# Patient Record
Sex: MALE | Race: WHITE | URBAN - NONMETROPOLITAN AREA
[De-identification: names, ages, dates, MRNs, and addresses within clinical notes are randomized per-mention and may not be internally consistent; named-entity substitution may affect disease eponyms.]

---

## 2021-07-29 ENCOUNTER — OFFICE VISIT (OUTPATIENT)
Dept: FAMILY MEDICINE CLINIC | Age: 26
End: 2021-07-29

## 2021-07-29 VITALS
WEIGHT: 163 LBS | OXYGEN SATURATION: 96 % | BODY MASS INDEX: 24.71 KG/M2 | TEMPERATURE: 97.4 F | DIASTOLIC BLOOD PRESSURE: 76 MMHG | RESPIRATION RATE: 18 BRPM | SYSTOLIC BLOOD PRESSURE: 120 MMHG | HEART RATE: 106 BPM | HEIGHT: 68 IN

## 2021-07-29 DIAGNOSIS — M54.9 MID BACK PAIN: Primary | ICD-10-CM

## 2021-07-29 DIAGNOSIS — R20.2 NUMBNESS AND TINGLING: ICD-10-CM

## 2021-07-29 DIAGNOSIS — R20.0 NUMBNESS AND TINGLING: ICD-10-CM

## 2021-07-29 PROCEDURE — 99215 OFFICE O/P EST HI 40 MIN: CPT | Performed by: NURSE PRACTITIONER

## 2021-07-29 NOTE — PROGRESS NOTES
Chief Complaint:   Back Pain (stemming into legs, arms, & face )    History of Present Illness   Source of history provided by:  patient. Sandra Adorno is a 23 y.o. old male who presents to the walk in clinic for evaluation of mid back pain for the past 1 days. Pt denies any known injury. Pt has a history of lower back pain in the past. Reports pain is worse with movement, nothing makes it better. There is radiation of the pain into the legs arms and face with numbness and tingling. He also endorses tingling in the scrotum. Pt denies any bowel/bladder incontinence, abdominal pain, hematuria, N/V/D, fever, chills, HA, neck pain, recent illness, dysuria, or lethargy. Review of Systems    Unless otherwise stated in this report or unable to obtain because of the patient's clinical or mental status as evidenced by the medical record, this patients's positive and negative responses for Review of Systems, constitutional, psych, eyes, ENT, cardiovascular, respiratory, gastrointestinal, neurological, genitourinary, musculoskeletal, integument systems and systems related to the presenting problem are either stated in the preceding or were not pertinent or were negative for the symptoms and/or complaints related to the medical problem. Past Medical History:  has no past medical history on file. Past Surgical History:  has no past surgical history on file. Social History:  reports that he has never smoked. He does not have any smokeless tobacco history on file. He reports that he does not drink alcohol and does not use drugs. Family History: family history is not on file. Allergies: Patient has no known allergies. Physical Exam   Vital Signs:  /76   Pulse 106   Temp 97.4 °F (36.3 °C)   Resp 18   Ht 5' 8\" (1.727 m)   Wt 163 lb (73.9 kg)   SpO2 96%   BMI 24.78 kg/m²    Oxygen Saturation Interpretation: Normal.    Constitutional:  Alert, development consistent with age. Neck:  Normal ROM. Supple. No TTP. Lungs:  Clear to auscultation and breath sounds equal.  Heart:  Regular rate and rhythm, normal heart sounds, without pathological murmurs, ectopy, gallops, or rubs. Abdomen:  Soft, nontender, good bowel sounds. No firm or pulsatile mass. Back: Tenderness: TTP over bilateral mid back with no appreciable midline tenderness. Swelling: No edema. Range of Motion: Decreased lateral and front to back ROM due to pain. CVA Tenderness: No CVA tenderness bilaterally. Skin:  No bruising, redness, abrasions, or rashes. Distal Function:              Motor deficit: Strength 5/5 in LE's bilaterally. Sensory deficit: Distal sensation intact. Pulse deficit: Distal pulses 2+ and bounding. Calf Tenderness:  No bilateral calf tenderness. Negative Latisha's sign bilaterally               Reflexes: Intact at knee and achilles bilaterally. Gait:  No antalgia noted. Skin:  Normal turgor. Warm, dry, without visible rash. Neurological:  Alert and oriented. Motor functions intact. Test Results Section   (All laboratory and radiology results have been personally reviewed by myself)  Labs:  No results found for this visit on 07/29/21. Imaging: All Radiology results interpreted by Radiologist unless otherwise noted. Assessment / Plan   Impression(s):  Rg Campbell was seen today for back pain. Diagnoses and all orders for this visit:    Mid back pain    Numbness and tingling    Pt advised that he needs a comprehensive workup including STAT imaging that is unable to be performed in an urgent care setting. EMS offered to patient but refused at this time. Pt advised to go straight to the ED for further evaluation and management. Pt agreed with this care plan and agreed to go immediately. Pt was transported to the ED by private vehicle. Pt left our office in stable condition. Further disposition to follow.  All questions answered. Electronically signed by ISABELLA Pickard CNP   DD: 7/29/21    **This report was transcribed using voice recognition software. Every effort was made to ensure accuracy; however, inadvertent computerized transcription errors may be present.

## 2021-12-30 ENCOUNTER — HOSPITAL ENCOUNTER (EMERGENCY)
Dept: HOSPITAL 83 - ED | Age: 26
Discharge: HOME | End: 2021-12-30
Payer: SELF-PAY

## 2021-12-30 VITALS — BODY MASS INDEX: 22.22 KG/M2 | WEIGHT: 150 LBS | HEIGHT: 68.98 IN

## 2021-12-30 DIAGNOSIS — R19.7: ICD-10-CM

## 2021-12-30 DIAGNOSIS — R11.2: Primary | ICD-10-CM

## 2021-12-30 DIAGNOSIS — R10.30: ICD-10-CM

## 2021-12-30 DIAGNOSIS — F17.200: ICD-10-CM

## 2021-12-30 LAB
ALBUMIN SERPL-MCNC: 3.9 GM/DL (ref 3.1–4.5)
ALP SERPL-CCNC: 66 U/L (ref 45–117)
ALT SERPL W P-5'-P-CCNC: 25 U/L (ref 12–78)
AMPHETAMINES UR QL SCN: < 1000
AST SERPL-CCNC: 17 IU/L (ref 3–35)
BACTERIA #/AREA URNS HPF: (no result) /[HPF]
BARBITURATES UR QL SCN: < 200
BASOPHILS # BLD AUTO: 0 10*3/UL (ref 0–0.1)
BASOPHILS NFR BLD AUTO: 0.2 % (ref 0–1)
BENZODIAZ UR QL SCN: > 200
BUN SERPL-MCNC: 16 MG/DL (ref 7–24)
BZE UR QL SCN: < 300
CANNABINOIDS UR QL SCN: > 50
CHLORIDE SERPL-SCNC: 106 MMOL/L (ref 98–107)
CREAT SERPL-MCNC: 0.91 MG/DL (ref 0.7–1.3)
EOSINOPHIL # BLD AUTO: 0 % (ref 1–4)
EOSINOPHIL # BLD AUTO: 0 10*3/UL (ref 0–0.4)
EPI CELLS #/AREA URNS HPF: (no result) /[HPF]
ERYTHROCYTE [DISTWIDTH] IN BLOOD BY AUTOMATED COUNT: 14 % (ref 0–14.5)
HCT VFR BLD AUTO: 41.4 % (ref 42–52)
LIPASE SERPL-CCNC: 50 U/L (ref 73–393)
LYMPHOCYTES # BLD AUTO: 1.3 10*3/UL (ref 1.3–4.4)
LYMPHOCYTES NFR BLD AUTO: 11.1 % (ref 27–41)
MCH RBC QN AUTO: 29.6 PG (ref 27–31)
MCHC RBC AUTO-ENTMCNC: 34.1 G/DL (ref 33–37)
MCV RBC AUTO: 87 FL (ref 80–94)
METHADONE UR QL SCN: < 300
MONOCYTES # BLD AUTO: 0.2 10*3/UL (ref 0.1–1)
MONOCYTES NFR BLD MANUAL: 1.7 % (ref 3–9)
NEUT #: 10.1 10*3/UL (ref 2.3–7.9)
NEUT %: 86.7 % (ref 47–73)
NRBC BLD QL AUTO: 0 10*3/UL (ref 0–0)
OPIATES UR QL SCN: < 300
PCP UR QL SCN: <  25
PH UR STRIP: >= 9 [PH] (ref 4.5–8)
PLATELET # BLD AUTO: 351 10*3/UL (ref 130–400)
PMV BLD AUTO: 9.6 FL (ref 9.6–12.3)
POTASSIUM SERPL-SCNC: 3.4 MMOL/L (ref 3.5–5.1)
PROT SERPL-MCNC: 7.6 GM/DL (ref 6.4–8.2)
RBC # BLD AUTO: 4.76 10*6/UL (ref 4.5–5.9)
RBC #/AREA URNS HPF: (no result) RBC/HPF (ref 0–2)
SODIUM SERPL-SCNC: 138 MMOL/L (ref 136–145)
SP GR UR: 1.02 (ref 1–1.03)
UROBILINOGEN UR STRIP-MCNC: 0.2 E.U./DL (ref 0–1)
WBC #/AREA URNS HPF: (no result) WBC/HPF (ref 0–5)
WBC NRBC COR # BLD AUTO: 11.6 10*3/UL (ref 4.8–10.8)

## 2023-06-30 ENCOUNTER — HOSPITAL ENCOUNTER (EMERGENCY)
Age: 28
Discharge: HOME HEALTH | End: 2023-06-30
Attending: STUDENT IN AN ORGANIZED HEALTH CARE EDUCATION/TRAINING PROGRAM
Payer: MEDICAID

## 2023-06-30 ENCOUNTER — APPOINTMENT (OUTPATIENT)
Dept: GENERAL RADIOLOGY | Age: 28
End: 2023-06-30
Payer: MEDICAID

## 2023-06-30 ENCOUNTER — HOSPITAL ENCOUNTER (EMERGENCY)
Age: 28
Discharge: HOME | End: 2023-06-30
Attending: EMERGENCY MEDICINE
Payer: MEDICAID

## 2023-06-30 VITALS
OXYGEN SATURATION: 99 % | BODY MASS INDEX: 22.05 KG/M2 | HEART RATE: 89 BPM | TEMPERATURE: 97.1 F | SYSTOLIC BLOOD PRESSURE: 133 MMHG | RESPIRATION RATE: 20 BRPM | WEIGHT: 145 LBS | DIASTOLIC BLOOD PRESSURE: 95 MMHG

## 2023-06-30 VITALS
TEMPERATURE: 97.9 F | SYSTOLIC BLOOD PRESSURE: 127 MMHG | DIASTOLIC BLOOD PRESSURE: 86 MMHG | OXYGEN SATURATION: 98 % | WEIGHT: 146 LBS | RESPIRATION RATE: 18 BRPM | HEART RATE: 68 BPM | BODY MASS INDEX: 22.2 KG/M2

## 2023-06-30 DIAGNOSIS — R07.89 ATYPICAL CHEST PAIN: ICD-10-CM

## 2023-06-30 DIAGNOSIS — R07.9 CHEST PAIN, UNSPECIFIED TYPE: Primary | ICD-10-CM

## 2023-06-30 DIAGNOSIS — D72.828 OTHER ELEVATED WHITE BLOOD CELL (WBC) COUNT: ICD-10-CM

## 2023-06-30 DIAGNOSIS — R07.89 OTHER CHEST PAIN: Primary | ICD-10-CM

## 2023-06-30 LAB
ALBUMIN SERPL-MCNC: 4.9 G/DL (ref 3.5–5.2)
ALP SERPL-CCNC: 74 U/L (ref 40–129)
ALT SERPL-CCNC: 17 U/L (ref 0–40)
ANION GAP SERPL CALCULATED.3IONS-SCNC: 16 MMOL/L (ref 7–16)
AST SERPL-CCNC: 20 U/L (ref 0–39)
BASOPHILS # BLD: 0.04 E9/L (ref 0–0.2)
BASOPHILS NFR BLD: 0.2 % (ref 0–2)
BILIRUB SERPL-MCNC: 0.5 MG/DL (ref 0–1.2)
BUN SERPL-MCNC: 20 MG/DL (ref 6–20)
CALCIUM SERPL-MCNC: 9.8 MG/DL (ref 8.6–10.2)
CHLORIDE SERPL-SCNC: 96 MMOL/L (ref 98–107)
CO2 SERPL-SCNC: 25 MMOL/L (ref 22–29)
CREAT SERPL-MCNC: 0.8 MG/DL (ref 0.7–1.2)
D DIMER: <200 NG/ML DDU
EOSINOPHIL # BLD: 0 E9/L (ref 0.05–0.5)
EOSINOPHIL NFR BLD: 0 % (ref 0–6)
ERYTHROCYTE [DISTWIDTH] IN BLOOD BY AUTOMATED COUNT: 13.8 FL (ref 11.5–15)
GLUCOSE SERPL-MCNC: 96 MG/DL (ref 74–99)
HCT VFR BLD AUTO: 42.7 % (ref 37–54)
HGB BLD-MCNC: 15.2 G/DL (ref 12.5–16.5)
IMM GRANULOCYTES # BLD: 0.08 E9/L
IMM GRANULOCYTES NFR BLD: 0.5 % (ref 0–5)
LYMPHOCYTES # BLD: 1.69 E9/L (ref 1.5–4)
LYMPHOCYTES NFR BLD: 10.3 % (ref 20–42)
MCH RBC QN AUTO: 30 PG (ref 26–35)
MCHC RBC AUTO-ENTMCNC: 35.6 % (ref 32–34.5)
MCV RBC AUTO: 84.2 FL (ref 80–99.9)
MONOCYTES # BLD: 0.87 E9/L (ref 0.1–0.95)
MONOCYTES NFR BLD: 5.3 % (ref 2–12)
NEUTROPHILS # BLD: 13.69 E9/L (ref 1.8–7.3)
NEUTS SEG NFR BLD: 83.7 % (ref 43–80)
PLATELET # BLD AUTO: 333 E9/L (ref 130–450)
PMV BLD AUTO: 9.7 FL (ref 7–12)
POTASSIUM SERPL-SCNC: 3.5 MMOL/L (ref 3.5–5)
PROT SERPL-MCNC: 7.7 G/DL (ref 6.4–8.3)
RBC # BLD AUTO: 5.07 E12/L (ref 3.8–5.8)
SODIUM SERPL-SCNC: 137 MMOL/L (ref 132–146)
TROPONIN, HIGH SENSITIVITY: 7 NG/L (ref 0–11)
WBC # BLD: 16.4 E9/L (ref 4.5–11.5)

## 2023-06-30 PROCEDURE — 71045 X-RAY EXAM CHEST 1 VIEW: CPT

## 2023-06-30 PROCEDURE — 80053 COMPREHEN METABOLIC PANEL: CPT

## 2023-06-30 PROCEDURE — 93005 ELECTROCARDIOGRAM TRACING: CPT | Performed by: EMERGENCY MEDICINE

## 2023-06-30 PROCEDURE — 85025 COMPLETE CBC W/AUTO DIFF WBC: CPT

## 2023-06-30 PROCEDURE — 99284 EMERGENCY DEPT VISIT MOD MDM: CPT

## 2023-06-30 PROCEDURE — 6370000000 HC RX 637 (ALT 250 FOR IP): Performed by: EMERGENCY MEDICINE

## 2023-06-30 PROCEDURE — 2580000003 HC RX 258: Performed by: EMERGENCY MEDICINE

## 2023-06-30 PROCEDURE — 85378 FIBRIN DEGRADE SEMIQUANT: CPT

## 2023-06-30 PROCEDURE — 84484 ASSAY OF TROPONIN QUANT: CPT

## 2023-06-30 PROCEDURE — 99281 EMR DPT VST MAYX REQ PHY/QHP: CPT

## 2023-06-30 PROCEDURE — 36415 COLL VENOUS BLD VENIPUNCTURE: CPT

## 2023-06-30 RX ORDER — 0.9 % SODIUM CHLORIDE 0.9 %
1000 INTRAVENOUS SOLUTION INTRAVENOUS ONCE
Status: COMPLETED | OUTPATIENT
Start: 2023-06-30 | End: 2023-06-30

## 2023-06-30 RX ORDER — FAMOTIDINE 20 MG/1
20 TABLET, FILM COATED ORAL 2 TIMES DAILY
Qty: 60 TABLET | Refills: 0 | Status: SHIPPED | OUTPATIENT
Start: 2023-06-30

## 2023-06-30 RX ORDER — FAMOTIDINE 20 MG/1
20 TABLET, FILM COATED ORAL 2 TIMES DAILY
Qty: 60 TABLET | Refills: 0 | Status: SHIPPED | OUTPATIENT
Start: 2023-06-30 | End: 2023-06-30 | Stop reason: SDUPTHER

## 2023-06-30 RX ORDER — FAMOTIDINE 20 MG/1
20 TABLET, FILM COATED ORAL ONCE
Status: COMPLETED | OUTPATIENT
Start: 2023-06-30 | End: 2023-06-30

## 2023-06-30 RX ADMIN — FAMOTIDINE 20 MG: 20 TABLET ORAL at 22:19

## 2023-06-30 RX ADMIN — SODIUM CHLORIDE 1000 ML: 9 INJECTION, SOLUTION INTRAVENOUS at 20:56

## 2023-06-30 ASSESSMENT — PAIN DESCRIPTION - LOCATION
LOCATION: BACK;ABDOMEN;CHEST
LOCATION: CHEST

## 2023-06-30 ASSESSMENT — PAIN DESCRIPTION - DESCRIPTORS: DESCRIPTORS: ACHING;DISCOMFORT;SORE;CRAMPING

## 2023-06-30 ASSESSMENT — PAIN - FUNCTIONAL ASSESSMENT
PAIN_FUNCTIONAL_ASSESSMENT: 0-10
PAIN_FUNCTIONAL_ASSESSMENT: 0-10

## 2023-06-30 ASSESSMENT — LIFESTYLE VARIABLES
HOW OFTEN DO YOU HAVE A DRINK CONTAINING ALCOHOL: NEVER
HOW MANY STANDARD DRINKS CONTAINING ALCOHOL DO YOU HAVE ON A TYPICAL DAY: PATIENT DOES NOT DRINK
HOW MANY STANDARD DRINKS CONTAINING ALCOHOL DO YOU HAVE ON A TYPICAL DAY: PATIENT DOES NOT DRINK

## 2023-06-30 ASSESSMENT — PAIN DESCRIPTION - ORIENTATION: ORIENTATION: MID;UPPER

## 2023-06-30 ASSESSMENT — PAIN SCALES - GENERAL
PAINLEVEL_OUTOF10: 9
PAINLEVEL_OUTOF10: 7

## 2023-06-30 ASSESSMENT — PAIN DESCRIPTION - PAIN TYPE: TYPE: ACUTE PAIN

## 2023-07-04 LAB
EKG ATRIAL RATE: 70 BPM
EKG P AXIS: 61 DEGREES
EKG P-R INTERVAL: 114 MS
EKG Q-T INTERVAL: 432 MS
EKG QRS DURATION: 104 MS
EKG QTC CALCULATION (BAZETT): 466 MS
EKG R AXIS: 71 DEGREES
EKG T AXIS: 53 DEGREES
EKG VENTRICULAR RATE: 70 BPM

## 2023-07-04 ASSESSMENT — HEART SCORE: ECG: 0

## 2023-10-12 ENCOUNTER — HOSPITAL ENCOUNTER (EMERGENCY)
Dept: HOSPITAL 83 - ED | Age: 28
Discharge: HOME | End: 2023-10-12
Payer: MEDICAID

## 2023-10-12 VITALS — WEIGHT: 155 LBS | HEIGHT: 60 IN

## 2023-10-12 DIAGNOSIS — R10.84: ICD-10-CM

## 2023-10-12 DIAGNOSIS — K59.00: Primary | ICD-10-CM

## 2023-10-12 LAB
ALP SERPL-CCNC: 69 U/L (ref 46–116)
ALT SERPL W P-5'-P-CCNC: 13 U/L (ref 10–49)
BACTERIA #/AREA URNS HPF: (no result) /[HPF]
BASOPHILS # BLD AUTO: 0 10*3/UL (ref 0–0.1)
BASOPHILS NFR BLD AUTO: 0.2 % (ref 0–1)
BUN SERPL-MCNC: 13 MG/DL (ref 9–23)
CHLORIDE SERPL-SCNC: 106 MMOL/L (ref 98–107)
EOSINOPHIL # BLD AUTO: 0 10*3/UL (ref 0–0.4)
EOSINOPHIL # BLD AUTO: 0.1 % (ref 1–4)
EPI CELLS #/AREA URNS HPF: (no result) /[HPF]
ERYTHROCYTE [DISTWIDTH] IN BLOOD BY AUTOMATED COUNT: 13.2 % (ref 0–14.5)
HCT VFR BLD AUTO: 42.6 % (ref 42–52)
LIPASE SERPL-CCNC: 25 U/L (ref 12–53)
LYMPHOCYTES # BLD AUTO: 1.2 10*3/UL (ref 1.3–4.4)
LYMPHOCYTES NFR BLD AUTO: 11.7 % (ref 27–41)
MCH RBC QN AUTO: 29.8 PG (ref 27–31)
MCHC RBC AUTO-ENTMCNC: 34.3 G/DL (ref 33–37)
MCV RBC AUTO: 86.9 FL (ref 80–94)
MONOCYTES # BLD AUTO: 0.3 10*3/UL (ref 0.1–1)
MONOCYTES NFR BLD MANUAL: 2.9 % (ref 3–9)
NEUT #: 8.8 10*3/UL (ref 2.3–7.9)
NEUT %: 84.9 % (ref 47–73)
NRBC BLD QL AUTO: 0 10*3/UL (ref 0–0)
PH UR STRIP: 8.5 [PH] (ref 4.5–8)
PLATELET # BLD AUTO: 272 10*3/UL (ref 130–400)
PMV BLD AUTO: 9.5 FL (ref 9.6–12.3)
POTASSIUM SERPL-SCNC: 4.4 MMOL/L (ref 3.4–5.1)
PROT SERPL-MCNC: 7.3 GM/DL (ref 6–8)
RBC # BLD AUTO: 4.9 10*6/UL (ref 4.5–5.9)
RBC #/AREA URNS HPF: (no result) RBC/HPF (ref 0–2)
SP GR UR: 1.02 (ref 1–1.03)
UROBILINOGEN UR STRIP-MCNC: 0.2 E.U./DL (ref 0–1)
WBC #/AREA URNS HPF: (no result) WBC/HPF (ref 0–5)
WBC NRBC COR # BLD AUTO: 10.4 10*3/UL (ref 4.8–10.8)

## 2023-12-01 ENCOUNTER — HOSPITAL ENCOUNTER (EMERGENCY)
Age: 28
Discharge: HOME | End: 2023-12-01
Attending: EMERGENCY MEDICINE
Payer: MEDICAID

## 2023-12-01 ENCOUNTER — APPOINTMENT (OUTPATIENT)
Dept: CT IMAGING | Age: 28
End: 2023-12-01
Payer: MEDICAID

## 2023-12-01 VITALS
RESPIRATION RATE: 24 BRPM | OXYGEN SATURATION: 99 % | BODY MASS INDEX: 20.4 KG/M2 | DIASTOLIC BLOOD PRESSURE: 68 MMHG | WEIGHT: 130 LBS | TEMPERATURE: 98.1 F | HEIGHT: 67 IN | HEART RATE: 72 BPM | SYSTOLIC BLOOD PRESSURE: 99 MMHG

## 2023-12-01 DIAGNOSIS — F11.93 OPIOID WITHDRAWAL (HCC): Primary | ICD-10-CM

## 2023-12-01 DIAGNOSIS — R10.13 ABDOMINAL PAIN, EPIGASTRIC: ICD-10-CM

## 2023-12-01 DIAGNOSIS — E86.0 DEHYDRATION: ICD-10-CM

## 2023-12-01 LAB
ALBUMIN SERPL-MCNC: 4.4 G/DL (ref 3.5–5.2)
ALP SERPL-CCNC: 66 U/L (ref 40–129)
ALT SERPL-CCNC: 14 U/L (ref 0–40)
ANION GAP SERPL CALCULATED.3IONS-SCNC: 16 MMOL/L (ref 7–16)
AST SERPL-CCNC: 16 U/L (ref 0–39)
BACTERIA URNS QL MICRO: ABNORMAL
BASOPHILS # BLD: 0.04 K/UL (ref 0–0.2)
BASOPHILS NFR BLD: 0 % (ref 0–2)
BILIRUB SERPL-MCNC: 0.7 MG/DL (ref 0–1.2)
BILIRUB UR QL STRIP: NEGATIVE
BUN SERPL-MCNC: 26 MG/DL (ref 6–20)
CALCIUM SERPL-MCNC: 9.8 MG/DL (ref 8.6–10.2)
CHLORIDE SERPL-SCNC: 105 MMOL/L (ref 98–107)
CLARITY UR: ABNORMAL
CO2 SERPL-SCNC: 19 MMOL/L (ref 22–29)
COLOR UR: YELLOW
CREAT SERPL-MCNC: 0.9 MG/DL (ref 0.7–1.2)
EOSINOPHIL # BLD: 0 K/UL (ref 0.05–0.5)
EOSINOPHILS RELATIVE PERCENT: 0 % (ref 0–6)
ERYTHROCYTE [DISTWIDTH] IN BLOOD BY AUTOMATED COUNT: 13.4 % (ref 11.5–15)
GFR SERPL CREATININE-BSD FRML MDRD: >60 ML/MIN/1.73M2
GLUCOSE SERPL-MCNC: 119 MG/DL (ref 74–99)
GLUCOSE UR STRIP-MCNC: NEGATIVE MG/DL
HCT VFR BLD AUTO: 40.1 % (ref 37–54)
HGB BLD-MCNC: 14 G/DL (ref 12.5–16.5)
HGB UR QL STRIP.AUTO: NEGATIVE
IMM GRANULOCYTES # BLD AUTO: 0.08 K/UL (ref 0–0.58)
IMM GRANULOCYTES NFR BLD: 1 % (ref 0–5)
KETONES UR STRIP-MCNC: >80 MG/DL
LEUKOCYTE ESTERASE UR QL STRIP: NEGATIVE
LIPASE SERPL-CCNC: 11 U/L (ref 13–60)
LYMPHOCYTES NFR BLD: 1.04 K/UL (ref 1.5–4)
LYMPHOCYTES RELATIVE PERCENT: 6 % (ref 20–42)
MCH RBC QN AUTO: 29.5 PG (ref 26–35)
MCHC RBC AUTO-ENTMCNC: 34.9 G/DL (ref 32–34.5)
MCV RBC AUTO: 84.4 FL (ref 80–99.9)
MONOCYTES NFR BLD: 0.46 K/UL (ref 0.1–0.95)
MONOCYTES NFR BLD: 3 % (ref 2–12)
MUCOUS THREADS URNS QL MICRO: PRESENT
NEUTROPHILS NFR BLD: 91 % (ref 43–80)
NEUTS SEG NFR BLD: 15.98 K/UL (ref 1.8–7.3)
NITRITE UR QL STRIP: NEGATIVE
PH UR STRIP: >9 [PH] (ref 5–9)
PLATELET # BLD AUTO: 297 K/UL (ref 130–450)
PMV BLD AUTO: 10.6 FL (ref 7–12)
POTASSIUM SERPL-SCNC: 3.1 MMOL/L (ref 3.5–5)
PROT SERPL-MCNC: 7.2 G/DL (ref 6.4–8.3)
PROT UR STRIP-MCNC: ABNORMAL MG/DL
RBC # BLD AUTO: 4.75 M/UL (ref 3.8–5.8)
RBC #/AREA URNS HPF: ABNORMAL /HPF
SODIUM SERPL-SCNC: 140 MMOL/L (ref 132–146)
SP GR UR STRIP: 1.01 (ref 1–1.03)
UROBILINOGEN UR STRIP-ACNC: 0.2 EU/DL (ref 0–1)
WBC #/AREA URNS HPF: ABNORMAL /HPF
WBC OTHER # BLD: 17.6 K/UL (ref 4.5–11.5)

## 2023-12-01 PROCEDURE — 96372 THER/PROPH/DIAG INJ SC/IM: CPT

## 2023-12-01 PROCEDURE — 99284 EMERGENCY DEPT VISIT MOD MDM: CPT

## 2023-12-01 PROCEDURE — 81001 URINALYSIS AUTO W/SCOPE: CPT

## 2023-12-01 PROCEDURE — 96374 THER/PROPH/DIAG INJ IV PUSH: CPT

## 2023-12-01 PROCEDURE — 6370000000 HC RX 637 (ALT 250 FOR IP): Performed by: EMERGENCY MEDICINE

## 2023-12-01 PROCEDURE — 74176 CT ABD & PELVIS W/O CONTRAST: CPT

## 2023-12-01 PROCEDURE — 96361 HYDRATE IV INFUSION ADD-ON: CPT

## 2023-12-01 PROCEDURE — 85025 COMPLETE CBC W/AUTO DIFF WBC: CPT

## 2023-12-01 PROCEDURE — 80053 COMPREHEN METABOLIC PANEL: CPT

## 2023-12-01 PROCEDURE — 83690 ASSAY OF LIPASE: CPT

## 2023-12-01 PROCEDURE — 2580000003 HC RX 258: Performed by: EMERGENCY MEDICINE

## 2023-12-01 PROCEDURE — 6360000002 HC RX W HCPCS: Performed by: EMERGENCY MEDICINE

## 2023-12-01 RX ORDER — BUPRENORPHINE HYDROCHLORIDE AND NALOXONE HYDROCHLORIDE DIHYDRATE 8; 2 MG/1; MG/1
1 TABLET SUBLINGUAL ONCE
Status: COMPLETED | OUTPATIENT
Start: 2023-12-01 | End: 2023-12-01

## 2023-12-01 RX ORDER — 0.9 % SODIUM CHLORIDE 0.9 %
1000 INTRAVENOUS SOLUTION INTRAVENOUS ONCE
Status: COMPLETED | OUTPATIENT
Start: 2023-12-01 | End: 2023-12-01

## 2023-12-01 RX ORDER — DICYCLOMINE HYDROCHLORIDE 10 MG/ML
20 INJECTION INTRAMUSCULAR ONCE
Status: COMPLETED | OUTPATIENT
Start: 2023-12-01 | End: 2023-12-01

## 2023-12-01 RX ORDER — ONDANSETRON 2 MG/ML
4 INJECTION INTRAMUSCULAR; INTRAVENOUS ONCE
Status: COMPLETED | OUTPATIENT
Start: 2023-12-01 | End: 2023-12-01

## 2023-12-01 RX ADMIN — ONDANSETRON 4 MG: 2 INJECTION INTRAMUSCULAR; INTRAVENOUS at 09:49

## 2023-12-01 RX ADMIN — Medication: at 11:59

## 2023-12-01 RX ADMIN — SODIUM CHLORIDE 1000 ML: 9 INJECTION, SOLUTION INTRAVENOUS at 09:47

## 2023-12-01 RX ADMIN — DICYCLOMINE HYDROCHLORIDE 20 MG: 10 INJECTION, SOLUTION INTRAMUSCULAR at 09:48

## 2023-12-01 RX ADMIN — BUPRENORPHINE HYDROCHLORIDE AND NALOXONE HYDROCHLORIDE DIHYDRATE 1 TABLET: 8; 2 TABLET SUBLINGUAL at 09:48

## 2023-12-01 ASSESSMENT — PAIN - FUNCTIONAL ASSESSMENT: PAIN_FUNCTIONAL_ASSESSMENT: 0-10

## 2023-12-01 ASSESSMENT — ENCOUNTER SYMPTOMS
EYE DISCHARGE: 0
DIARRHEA: 0
NAUSEA: 1
ABDOMINAL PAIN: 1
BACK PAIN: 0
SHORTNESS OF BREATH: 0
EYE PAIN: 0
WHEEZING: 0
COUGH: 0
SINUS PRESSURE: 0
SORE THROAT: 0
EYE REDNESS: 0
VOMITING: 1

## 2023-12-01 ASSESSMENT — PAIN DESCRIPTION - LOCATION
LOCATION: ABDOMEN
LOCATION: ABDOMEN

## 2023-12-01 ASSESSMENT — PAIN SCALES - GENERAL
PAINLEVEL_OUTOF10: 10
PAINLEVEL_OUTOF10: 10

## 2023-12-01 NOTE — CARE COORDINATION
Peer Recovery Support Note    Name: Jonn Tate  Date: 12/1/2023    Chief Complaint   Patient presents with    Abdominal Pain     Pt having abdominal pain going through fentanyl withdraw last time used was 4 days ago. Since has been taking ibuprofen about 10 a day and is now having severe abdominal pain. Nausea and vomitting        Peer Support met with patient. [] Support and education provided  [] Resources provided   [x] Treatment referral: [facility]  [] Other:   [] Patient declined peer recovery services     Referred By: Gracie Barros (RAJAT)    Notes:   Patient did assessment with [facility] and he was accepted. When medically cleared they will provide transportation ([phone]) RAJAT is aware of this plan.    Signed: Fer Ellison, 12/1/2023

## 2023-12-01 NOTE — CARE COORDINATION
Social Work/Transition of Care:     Pt presents due to Withdrawal from Fentanyl and requests assistance with transferring to [facility], pt reports last use Tuesday, RAJAT spoke with peer support who stated they are aware of pt in the ED and will assist with disposition. Electronically signed by Chuck Hutchinson on 34/8/7139 at 10:35 AM     Peer Support arranged for pt to go to [facility] as requested with transportation provided, upon discharge pt stated he needed to go home and get his personal belongings, pt stated his family will transport, SW encouraged pt to let his family gather his belongings and let the facility pick him up as planned, pt declined.   SW notified Bozena who will contact [facility] and notify of pt family transporting, pt will need to arrive by 3:00 pm.    Electronically signed by KIMMIE ley on 78/8/0840 at 1:12 PM

## 2023-12-01 NOTE — ED PROVIDER NOTES
known allergies.     -------------------------------------------------- RESULTS -------------------------------------------------  Labs:  Results for orders placed or performed during the hospital encounter of 12/01/23   CBC with Auto Differential   Result Value Ref Range    WBC 17.6 (H) 4.5 - 11.5 k/uL    RBC 4.75 3.80 - 5.80 m/uL    Hemoglobin 14.0 12.5 - 16.5 g/dL    Hematocrit 40.1 37.0 - 54.0 %    MCV 84.4 80.0 - 99.9 fL    MCH 29.5 26.0 - 35.0 pg    MCHC 34.9 (H) 32.0 - 34.5 g/dL    RDW 13.4 11.5 - 15.0 %    Platelets 405 130 - 206 k/uL    MPV 10.6 7.0 - 12.0 fL    Neutrophils % 91 (H) 43.0 - 80.0 %    Lymphocytes % 6 (L) 20.0 - 42.0 %    Monocytes % 3 2.0 - 12.0 %    Eosinophils % 0 0 - 6 %    Basophils % 0 0.0 - 2.0 %    Immature Granulocytes 1 0.0 - 5.0 %    Neutrophils Absolute 15.98 (H) 1.80 - 7.30 k/uL    Lymphocytes Absolute 1.04 (L) 1.50 - 4.00 k/uL    Monocytes Absolute 0.46 0.10 - 0.95 k/uL    Eosinophils Absolute 0.00 (L) 0.05 - 0.50 k/uL    Basophils Absolute 0.04 0.00 - 0.20 k/uL    Absolute Immature Granulocyte 0.08 0.00 - 0.58 k/uL   Comprehensive Metabolic Panel   Result Value Ref Range    Sodium 140 132 - 146 mmol/L    Potassium 3.1 (L) 3.5 - 5.0 mmol/L    Chloride 105 98 - 107 mmol/L    CO2 19 (L) 22 - 29 mmol/L    Anion Gap 16 7 - 16 mmol/L    Glucose 119 (H) 74 - 99 mg/dL    BUN 26 (H) 6 - 20 mg/dL    Creatinine 0.9 0.70 - 1.20 mg/dL    Est, Glom Filt Rate >60 >60 mL/min/1.73m2    Calcium 9.8 8.6 - 10.2 mg/dL    Total Protein 7.2 6.4 - 8.3 g/dL    Albumin 4.4 3.5 - 5.2 g/dL    Total Bilirubin 0.7 0.0 - 1.2 mg/dL    Alkaline Phosphatase 66 40 - 129 U/L    ALT 14 0 - 40 U/L    AST 16 0 - 39 U/L   Lipase   Result Value Ref Range    Lipase 11 (L) 13 - 60 U/L   Urinalysis   Result Value Ref Range    Color, UA Yellow Yellow    Turbidity UA SLIGHTLY CLOUDY (A) Clear    Glucose, Ur NEGATIVE NEGATIVE mg/dL    Bilirubin Urine NEGATIVE NEGATIVE    Ketones, Urine >80 (A) NEGATIVE mg/dL    Specific

## 2025-04-23 ENCOUNTER — HOSPITAL ENCOUNTER (EMERGENCY)
Age: 30
Discharge: LEFT BEFORE DISCHARGE | End: 2025-04-23

## 2025-04-23 ASSESSMENT — LIFESTYLE VARIABLES
HOW OFTEN DO YOU HAVE A DRINK CONTAINING ALCOHOL: NEVER
HOW MANY STANDARD DRINKS CONTAINING ALCOHOL DO YOU HAVE ON A TYPICAL DAY: PATIENT DOES NOT DRINK

## 2025-04-24 ENCOUNTER — HOSPITAL ENCOUNTER (EMERGENCY)
Age: 30
Discharge: HOME | End: 2025-04-24
Attending: EMERGENCY MEDICINE

## 2025-04-24 ENCOUNTER — APPOINTMENT (OUTPATIENT)
Dept: GENERAL RADIOLOGY | Age: 30
End: 2025-04-24

## 2025-04-24 VITALS
OXYGEN SATURATION: 100 % | SYSTOLIC BLOOD PRESSURE: 134 MMHG | DIASTOLIC BLOOD PRESSURE: 80 MMHG | RESPIRATION RATE: 20 BRPM | TEMPERATURE: 98 F | HEART RATE: 68 BPM

## 2025-04-24 DIAGNOSIS — M25.532 LEFT WRIST PAIN: ICD-10-CM

## 2025-04-24 DIAGNOSIS — R11.0 NAUSEA: ICD-10-CM

## 2025-04-24 DIAGNOSIS — R07.9 CHEST PAIN, UNSPECIFIED TYPE: Primary | ICD-10-CM

## 2025-04-24 LAB
ANION GAP SERPL CALCULATED.3IONS-SCNC: 19 MMOL/L (ref 7–16)
BASOPHILS # BLD: 0.01 K/UL (ref 0–0.2)
BASOPHILS NFR BLD: 0 % (ref 0–2)
BNP SERPL-MCNC: <36 PG/ML (ref 0–125)
BUN SERPL-MCNC: 36 MG/DL (ref 6–20)
CALCIUM SERPL-MCNC: 10.2 MG/DL (ref 8.6–10.2)
CHLORIDE SERPL-SCNC: 93 MMOL/L (ref 98–107)
CO2 SERPL-SCNC: 18 MMOL/L (ref 22–29)
CREAT SERPL-MCNC: 0.9 MG/DL (ref 0.7–1.2)
EKG ATRIAL RATE: 60 BPM
EKG P AXIS: 74 DEGREES
EKG P-R INTERVAL: 124 MS
EKG Q-T INTERVAL: 454 MS
EKG QRS DURATION: 106 MS
EKG QTC CALCULATION (BAZETT): 454 MS
EKG R AXIS: 51 DEGREES
EKG T AXIS: 48 DEGREES
EKG VENTRICULAR RATE: 60 BPM
EOSINOPHIL # BLD: 0 K/UL (ref 0.05–0.5)
EOSINOPHILS RELATIVE PERCENT: 0 % (ref 0–6)
ERYTHROCYTE [DISTWIDTH] IN BLOOD BY AUTOMATED COUNT: 15.1 % (ref 11.5–15)
GFR, ESTIMATED: >90 ML/MIN/1.73M2
GLUCOSE SERPL-MCNC: 99 MG/DL (ref 74–99)
HCT VFR BLD AUTO: 42.9 % (ref 37–54)
HGB BLD-MCNC: 14.4 G/DL (ref 12.5–16.5)
IMM GRANULOCYTES # BLD AUTO: 0.04 K/UL (ref 0–0.58)
IMM GRANULOCYTES NFR BLD: 0 % (ref 0–5)
LIPASE SERPL-CCNC: 15 U/L (ref 13–60)
LYMPHOCYTES NFR BLD: 1.46 K/UL (ref 1.5–4)
LYMPHOCYTES RELATIVE PERCENT: 15 % (ref 20–42)
MCH RBC QN AUTO: 28.6 PG (ref 26–35)
MCHC RBC AUTO-ENTMCNC: 33.6 G/DL (ref 32–34.5)
MCV RBC AUTO: 85.1 FL (ref 80–99.9)
MONOCYTES NFR BLD: 0.62 K/UL (ref 0.1–0.95)
MONOCYTES NFR BLD: 7 % (ref 2–12)
NEUTROPHILS NFR BLD: 78 % (ref 43–80)
NEUTS SEG NFR BLD: 7.36 K/UL (ref 1.8–7.3)
PLATELET # BLD AUTO: 435 K/UL (ref 130–450)
PMV BLD AUTO: 10.2 FL (ref 7–12)
POTASSIUM SERPL-SCNC: 4.6 MMOL/L (ref 3.5–5)
RBC # BLD AUTO: 5.04 M/UL (ref 3.8–5.8)
SODIUM SERPL-SCNC: 130 MMOL/L (ref 132–146)
TROPONIN I SERPL HS-MCNC: 6 NG/L (ref 0–22)
WBC OTHER # BLD: 9.5 K/UL (ref 4.5–11.5)

## 2025-04-24 PROCEDURE — 99285 EMERGENCY DEPT VISIT HI MDM: CPT

## 2025-04-24 PROCEDURE — 96374 THER/PROPH/DIAG INJ IV PUSH: CPT

## 2025-04-24 PROCEDURE — 83690 ASSAY OF LIPASE: CPT

## 2025-04-24 PROCEDURE — 71045 X-RAY EXAM CHEST 1 VIEW: CPT

## 2025-04-24 PROCEDURE — 84484 ASSAY OF TROPONIN QUANT: CPT

## 2025-04-24 PROCEDURE — 6360000002 HC RX W HCPCS: Performed by: EMERGENCY MEDICINE

## 2025-04-24 PROCEDURE — 83880 ASSAY OF NATRIURETIC PEPTIDE: CPT

## 2025-04-24 PROCEDURE — 73110 X-RAY EXAM OF WRIST: CPT

## 2025-04-24 PROCEDURE — 80048 BASIC METABOLIC PNL TOTAL CA: CPT

## 2025-04-24 PROCEDURE — 85025 COMPLETE CBC W/AUTO DIFF WBC: CPT

## 2025-04-24 RX ORDER — ONDANSETRON 4 MG/1
4 TABLET, FILM COATED ORAL EVERY 8 HOURS PRN
Qty: 10 TABLET | Refills: 0 | Status: SHIPPED | OUTPATIENT
Start: 2025-04-24

## 2025-04-24 RX ORDER — KETOROLAC TROMETHAMINE 15 MG/ML
15 INJECTION, SOLUTION INTRAMUSCULAR; INTRAVENOUS ONCE
Status: COMPLETED | OUTPATIENT
Start: 2025-04-24 | End: 2025-04-24

## 2025-04-24 RX ORDER — NAPROXEN 500 MG/1
500 TABLET ORAL 2 TIMES DAILY
Qty: 14 TABLET | Refills: 0 | Status: SHIPPED | OUTPATIENT
Start: 2025-04-24 | End: 2025-05-01

## 2025-04-24 RX ADMIN — KETOROLAC TROMETHAMINE 15 MG: 15 INJECTION, SOLUTION INTRAMUSCULAR; INTRAVENOUS at 10:36

## 2025-04-24 ASSESSMENT — PAIN SCALES - GENERAL
PAINLEVEL_OUTOF10: 7
PAINLEVEL_OUTOF10: 7

## 2025-04-24 ASSESSMENT — ENCOUNTER SYMPTOMS
WHEEZING: 0
EYE PAIN: 0
EYE DISCHARGE: 0
COUGH: 0
DIARRHEA: 0
BACK PAIN: 0
NAUSEA: 1
VOMITING: 1
SHORTNESS OF BREATH: 0
SORE THROAT: 0
EYE REDNESS: 0
ABDOMINAL PAIN: 0
SINUS PRESSURE: 0

## 2025-04-24 ASSESSMENT — PAIN - FUNCTIONAL ASSESSMENT: PAIN_FUNCTIONAL_ASSESSMENT: 0-10

## 2025-04-24 ASSESSMENT — PAIN DESCRIPTION - LOCATION: LOCATION: CHEST

## 2025-04-24 NOTE — ED NOTES
Patient called to triage X 3, seen walking out lobby door after EKG.  Patient did not notify staff he was leaving, unable to be found in [facility], restroom or parking lot.

## 2025-04-24 NOTE — ED PROVIDER NOTES
29-year-old male presents to the emergency department with chest pain.  Patient was in our emergency department yesterday but reports it was too busy so he left he reports a mild headache as well as some nausea and vomiting.  Patient also reports some left wrist pain the person with him states there is significant swelling to the left wrist yesterday though they report at this time it is resolved.  Patient states currently no pain no redness or swelling or other complaints at this time    The history is provided by the patient.   Chest Pain  Pain location:  Substernal area  Pain quality: aching    Pain radiates to:  Does not radiate  Pain severity:  Mild  Onset quality:  Gradual  Duration:  2 days  Timing:  Intermittent  Progression:  Waxing and waning  Chronicity:  New  Relieved by:  Nothing  Worsened by:  Nothing  Ineffective treatments:  None tried  Associated symptoms: fatigue, headache, nausea and vomiting    Associated symptoms: no abdominal pain, no back pain, no cough, no fever, no shortness of breath and no weakness         Review of Systems   Constitutional:  Positive for fatigue. Negative for chills and fever.   HENT:  Negative for ear pain, sinus pressure and sore throat.    Eyes:  Negative for pain, discharge and redness.   Respiratory:  Negative for cough, shortness of breath and wheezing.    Cardiovascular:  Positive for chest pain.   Gastrointestinal:  Positive for nausea and vomiting. Negative for abdominal pain and diarrhea.   Genitourinary:  Negative for dysuria and frequency.   Musculoskeletal:  Negative for arthralgias and back pain.   Skin:  Negative for rash and wound.   Neurological:  Positive for headaches. Negative for weakness.   Hematological:  Negative for adenopathy.   All other systems reviewed and are negative.       Physical Exam  Constitutional:       Appearance: Normal appearance. He is normal weight.   HENT:      Head: Normocephalic and atraumatic.      Nose: Nose normal.

## 2025-04-24 NOTE — DISCHARGE INSTR - COC
Continuity of Care Form    Patient Name: Jag Salter   :  1995  MRN:  [id number]    Admit date:  2025  Discharge date:  ***    Code Status Order: No Order   Advance Directives:     Admitting Physician:  No admitting provider for patient encounter.  PCP: No primary care provider on file.    Discharging Nurse: ***  Discharging Hospital Unit/Room#: DISPO/D01  Discharging Unit Phone Number: ***    Emergency Contact:   Extended Emergency Contact Information  Primary Emergency Contact: Abby Can  Address: [address] [place] [address]  Home Phone: [phone]  Mobile Phone: [phone]  Relation: Parent    Past Surgical History:  No past surgical history on file.    Immunization History:     There is no immunization history on file for this patient.    Active Problems:  There is no problem list on file for this patient.      Isolation/Infection:   Isolation            No Isolation          Patient Infection Status    None to display         Nurse Assessment:  Last Vital Signs: /80   Pulse 68   Temp 98 °F (36.7 °C) (Oral)   Resp 20   SpO2 100%     Last documented pain score (0-10 scale): Pain Level: 7  Last Weight:   Wt Readings from Last 1 Encounters:   23 59 kg (130 lb)     Mental Status:  {IP PT MENTAL STATUS:}    IV Access:  { [facility] IV ACCESS:093105861}    Nursing Mobility/ADLs:  Walking   {CHP DME ADLs:723021784}  Transfer  {CHP DME ADLs:181486191}  Bathing  {CHP DME ADLs:621706158}  Dressing  {CHP DME ADLs:276593152}  Toileting  {CHP DME ADLs:569492201}  Feeding  {CHP DME ADLs:255002819}  Med Admin  {CHP DME ADLs:152806551}  Med Delivery   { [facility] MED Delivery:595289636}    Wound Care Documentation and Therapy:        Elimination:  Continence:   Bowel: {YES / NO:}  Bladder: {YES / NO:}  Urinary Catheter: {Urinary Catheter:575321784}   Colostomy/Ileostomy/Ileal Conduit: {YES / NO:}       Date of Last BM: ***  No

## 2025-04-28 LAB
EKG ATRIAL RATE: 60 BPM
EKG P AXIS: 74 DEGREES
EKG P-R INTERVAL: 124 MS
EKG Q-T INTERVAL: 454 MS
EKG QRS DURATION: 106 MS
EKG QTC CALCULATION (BAZETT): 454 MS
EKG R AXIS: 51 DEGREES
EKG T AXIS: 48 DEGREES
EKG VENTRICULAR RATE: 60 BPM

## 2025-07-11 ENCOUNTER — OFFICE VISIT (OUTPATIENT)
Dept: ORTHOPEDIC SURGERY | Age: 30
End: 2025-07-11

## 2025-07-11 VITALS — HEIGHT: 69 IN | WEIGHT: 150 LBS | BODY MASS INDEX: 22.22 KG/M2

## 2025-07-11 DIAGNOSIS — M25.462 EFFUSION OF LEFT KNEE: Primary | ICD-10-CM

## 2025-07-11 DIAGNOSIS — M25.562 ACUTE PAIN OF LEFT KNEE: ICD-10-CM

## 2025-07-11 DIAGNOSIS — M79.662 PAIN OF LEFT CALF: ICD-10-CM

## 2025-07-11 DIAGNOSIS — M25.562 PAIN AND SWELLING OF LEFT KNEE: ICD-10-CM

## 2025-07-11 DIAGNOSIS — M25.462 EFFUSION OF LEFT KNEE: ICD-10-CM

## 2025-07-11 DIAGNOSIS — M25.462 PAIN AND SWELLING OF LEFT KNEE: ICD-10-CM

## 2025-07-11 LAB — URIC ACID: 2.5 MG/DL (ref 3.4–7)

## 2025-07-11 PROCEDURE — 99203 OFFICE O/P NEW LOW 30 MIN: CPT | Performed by: PHYSICIAN ASSISTANT

## 2025-07-11 RX ORDER — PREDNISONE 10 MG/1
TABLET ORAL
Qty: 24 TABLET | Refills: 0 | Status: SHIPPED | OUTPATIENT
Start: 2025-07-11

## 2025-07-11 NOTE — PROGRESS NOTES
recommend knee aspiration.  The risks and benefits of knee aspiration as well as intra-articular cortisone injections were reviewed with the patient.  Patient provided verbal consent to proceed.  Unfortunately were not able to aspirate any fluid from the knee joint as patient was not tolerating the procedure.  We will check a uric acid level.  At this time I have recommended an oral steroid, Prednisone 10mg 3 tablets once daily by mouth for 4 days, then 2 tablets once daily for 4 days, then 1 tablet once daily for 4 days then STOP. I did discuss potential side effect such as GI upset, mood changes, depression, anxiety, change in sleep habits.  The patient develops any of these signs or symptoms they will call the office immediately and we will discontinue the medication in appropriate fashion.  They are aware that he should not use any other oral anti-inflammatories while on the oral steroids.  They can use Tylenol OTC PRN.  If uric acid level is elevated would also start allopurinol.  If uric acid level is negative, would recommend referral to Dr. Torres for aspiration under image guidance.  I also recommend stat ultrasound of the left lower extremity to rule out DVT given the positive Homans' sign, family history of DVT and calf pain at rest.  If Doppler is negative, would recommend Ace wrap for the knee to help with compression.    Pt should apply ice to the effected area for no more than 20 minutes at a time repeating throughout the day as necessary.  They should elevate the extremity and rest.     Patient voiced understanding and agrees with the treatment plan outlined for them in the office today.  If they have any additional questions or concerns they should call the office.  If the symptoms are not improving or are worsening over the next 7 days, patient should return to the clinic for further evaluation.  Otherwise, I will call patient with results of ultrasound as well as uric acid.  I will plan to see

## 2025-07-14 ENCOUNTER — TELEPHONE (OUTPATIENT)
Dept: ORTHOPEDIC SURGERY | Age: 30
End: 2025-07-14

## 2025-07-14 NOTE — TELEPHONE ENCOUNTER
Called patient and LVM to return call to [facility] at [phone] to schedule for left knee effusion/pain.  Referral recommending Dr. Torres.  Will continue to make attempts to contact patient.